# Patient Record
Sex: FEMALE | Race: WHITE | NOT HISPANIC OR LATINO
[De-identification: names, ages, dates, MRNs, and addresses within clinical notes are randomized per-mention and may not be internally consistent; named-entity substitution may affect disease eponyms.]

---

## 2019-04-03 PROBLEM — Z00.00 ENCOUNTER FOR PREVENTIVE HEALTH EXAMINATION: Status: ACTIVE | Noted: 2019-04-03

## 2019-04-08 ENCOUNTER — APPOINTMENT (OUTPATIENT)
Dept: OBGYN | Facility: CLINIC | Age: 59
End: 2019-04-08
Payer: COMMERCIAL

## 2019-04-08 VITALS
HEIGHT: 64 IN | SYSTOLIC BLOOD PRESSURE: 160 MMHG | WEIGHT: 173 LBS | BODY MASS INDEX: 29.53 KG/M2 | DIASTOLIC BLOOD PRESSURE: 80 MMHG

## 2019-04-08 DIAGNOSIS — Z78.9 OTHER SPECIFIED HEALTH STATUS: ICD-10-CM

## 2019-04-08 DIAGNOSIS — Z87.891 PERSONAL HISTORY OF NICOTINE DEPENDENCE: ICD-10-CM

## 2019-04-08 DIAGNOSIS — Z83.3 FAMILY HISTORY OF DIABETES MELLITUS: ICD-10-CM

## 2019-04-08 DIAGNOSIS — Z01.419 ENCOUNTER FOR GYNECOLOGICAL EXAMINATION (GENERAL) (ROUTINE) W/OUT ABNORMAL FINDINGS: ICD-10-CM

## 2019-04-08 DIAGNOSIS — Z80.0 FAMILY HISTORY OF MALIGNANT NEOPLASM OF DIGESTIVE ORGANS: ICD-10-CM

## 2019-04-08 PROCEDURE — 36415 COLL VENOUS BLD VENIPUNCTURE: CPT

## 2019-04-08 PROCEDURE — 99386 PREV VISIT NEW AGE 40-64: CPT

## 2019-04-08 NOTE — PHYSICAL EXAM
[Awake] : awake [Alert] : alert [Acute Distress] : no acute distress [LAD] : no lymphadenopathy [Thyroid Nodule] : no thyroid nodule [Goiter] : no goiter [Mass] : no breast mass [Nipple Discharge] : no nipple discharge [Axillary LAD] : no axillary lymphadenopathy [Soft] : soft [Tender] : non tender [Oriented x3] : oriented to person, place, and time [Normal] : cervix [No Bleeding] : there was no active vaginal bleeding [Pap Obtained] : a Pap smear was performed [Absent] : absent [Uterine Adnexae] : were not tender and not enlarged

## 2019-04-09 LAB — TSH SERPL-ACNC: 1.98 UIU/ML

## 2019-04-10 LAB — HPV HIGH+LOW RISK DNA PNL CVX: NOT DETECTED

## 2019-04-15 LAB
CYTOLOGY CVX/VAG DOC THIN PREP: NORMAL
HGB A MFR BLD: 97.6 %
HGB A2 MFR BLD: 2.4 %
HGB FRACT BLD-IMP: NORMAL

## 2019-04-26 ENCOUNTER — APPOINTMENT (OUTPATIENT)
Dept: ULTRASOUND IMAGING | Facility: CLINIC | Age: 59
End: 2019-04-26
Payer: COMMERCIAL

## 2019-04-26 ENCOUNTER — APPOINTMENT (OUTPATIENT)
Dept: MAMMOGRAPHY | Facility: CLINIC | Age: 59
End: 2019-04-26
Payer: COMMERCIAL

## 2019-04-26 ENCOUNTER — OUTPATIENT (OUTPATIENT)
Dept: OUTPATIENT SERVICES | Facility: HOSPITAL | Age: 59
LOS: 1 days | End: 2019-04-26
Payer: COMMERCIAL

## 2019-04-26 DIAGNOSIS — Z00.8 ENCOUNTER FOR OTHER GENERAL EXAMINATION: ICD-10-CM

## 2019-04-26 PROCEDURE — 76641 ULTRASOUND BREAST COMPLETE: CPT | Mod: 26,50

## 2019-04-26 PROCEDURE — 77063 BREAST TOMOSYNTHESIS BI: CPT

## 2019-04-26 PROCEDURE — 77067 SCR MAMMO BI INCL CAD: CPT | Mod: 26

## 2019-04-26 PROCEDURE — 77063 BREAST TOMOSYNTHESIS BI: CPT | Mod: 26

## 2019-04-26 PROCEDURE — 76641 ULTRASOUND BREAST COMPLETE: CPT

## 2019-04-26 PROCEDURE — 77067 SCR MAMMO BI INCL CAD: CPT

## 2020-12-21 PROBLEM — Z01.419 ENCOUNTER FOR WELL WOMAN EXAM WITH ROUTINE GYNECOLOGICAL EXAM: Status: RESOLVED | Noted: 2019-04-08 | Resolved: 2020-12-21

## 2022-06-21 ENCOUNTER — APPOINTMENT (OUTPATIENT)
Dept: OBGYN | Facility: CLINIC | Age: 62
End: 2022-06-21
Payer: COMMERCIAL

## 2022-06-21 VITALS
DIASTOLIC BLOOD PRESSURE: 90 MMHG | BODY MASS INDEX: 30.73 KG/M2 | HEIGHT: 64 IN | SYSTOLIC BLOOD PRESSURE: 140 MMHG | WEIGHT: 180 LBS

## 2022-06-21 DIAGNOSIS — N95.1 MENOPAUSAL AND FEMALE CLIMACTERIC STATES: ICD-10-CM

## 2022-06-21 DIAGNOSIS — G47.00 INSOMNIA, UNSPECIFIED: ICD-10-CM

## 2022-06-21 DIAGNOSIS — Z01.419 ENCOUNTER FOR GYNECOLOGICAL EXAMINATION (GENERAL) (ROUTINE) W/OUT ABNORMAL FINDINGS: ICD-10-CM

## 2022-06-21 PROCEDURE — 99386 PREV VISIT NEW AGE 40-64: CPT

## 2022-06-21 RX ORDER — VENLAFAXINE HYDROCHLORIDE 37.5 MG/1
37.5 CAPSULE, EXTENDED RELEASE ORAL
Qty: 30 | Refills: 0 | Status: ACTIVE | COMMUNITY
Start: 2022-06-21 | End: 1900-01-01

## 2022-06-21 NOTE — REVIEW OF SYSTEMS
[Night Sweats] : night sweats [Recent Weight Gain (___ Lbs)] : recent [unfilled] ~Ulb weight gain [Bloating] : bloating [Sleep Disturbances] : sleep disturbances [Hot Flashes] : hot flashes [Negative] : Heme/Lymph

## 2022-06-21 NOTE — PHYSICAL EXAM
[Appropriately responsive] : appropriately responsive [Alert] : alert [No Acute Distress] : no acute distress [Soft] : soft [Non-tender] : non-tender [Examination Of The Breasts] : a normal appearance [No Masses] : no breast masses were palpable [Labia Majora] : normal [Labia Minora] : normal [Normal] : normal [Uterine Adnexae] : normal

## 2022-06-21 NOTE — HISTORY OF PRESENT ILLNESS
[Patient reported mammogram was normal] : Patient reported mammogram was normal [Patient reported breast sonogram was normal] : Patient reported breast sonogram was normal [Patient reported PAP Smear was normal] : Patient reported PAP Smear was normal [postmenopausal] : postmenopausal [N] : Patient does not use contraception [Y] : Patient is sexually active [Monogamous (Male Partner)] : is monogamous with a male partner [Patient refuses STI testing] : Patient refuses STI testing [FreeTextEntry1] : 63 yo presents for initial annual exam and pap smear with complaint of hot flashes and insomnia.  History of supracervical hysterectomy.  [Mammogramdate] : 2019 [BreastSonogramDate] : 2019 [PapSmeardate] : 2019 [BoneDensityDate] : unsure

## 2022-06-24 LAB — HPV HIGH+LOW RISK DNA PNL CVX: NOT DETECTED

## 2022-06-27 LAB — CYTOLOGY CVX/VAG DOC THIN PREP: NORMAL

## 2022-07-19 ENCOUNTER — RESULT REVIEW (OUTPATIENT)
Age: 62
End: 2022-07-19

## 2022-07-19 ENCOUNTER — APPOINTMENT (OUTPATIENT)
Dept: ULTRASOUND IMAGING | Facility: CLINIC | Age: 62
End: 2022-07-19

## 2022-07-19 ENCOUNTER — APPOINTMENT (OUTPATIENT)
Dept: RADIOLOGY | Facility: CLINIC | Age: 62
End: 2022-07-19

## 2022-07-19 ENCOUNTER — APPOINTMENT (OUTPATIENT)
Dept: MAMMOGRAPHY | Facility: CLINIC | Age: 62
End: 2022-07-19

## 2022-07-19 ENCOUNTER — OUTPATIENT (OUTPATIENT)
Dept: OUTPATIENT SERVICES | Facility: HOSPITAL | Age: 62
LOS: 1 days | End: 2022-07-19
Payer: COMMERCIAL

## 2022-07-19 DIAGNOSIS — Z00.00 ENCOUNTER FOR GENERAL ADULT MEDICAL EXAMINATION WITHOUT ABNORMAL FINDINGS: ICD-10-CM

## 2022-07-19 PROCEDURE — 77067 SCR MAMMO BI INCL CAD: CPT | Mod: 26

## 2022-07-19 PROCEDURE — 77063 BREAST TOMOSYNTHESIS BI: CPT | Mod: 26

## 2022-07-19 PROCEDURE — 76641 ULTRASOUND BREAST COMPLETE: CPT | Mod: 26,50

## 2022-07-19 PROCEDURE — 77067 SCR MAMMO BI INCL CAD: CPT

## 2022-07-19 PROCEDURE — 77080 DXA BONE DENSITY AXIAL: CPT | Mod: 26

## 2022-07-19 PROCEDURE — 77080 DXA BONE DENSITY AXIAL: CPT

## 2022-07-19 PROCEDURE — 76641 ULTRASOUND BREAST COMPLETE: CPT

## 2022-07-19 PROCEDURE — 77063 BREAST TOMOSYNTHESIS BI: CPT
